# Patient Record
Sex: MALE | Race: WHITE | NOT HISPANIC OR LATINO | URBAN - METROPOLITAN AREA
[De-identification: names, ages, dates, MRNs, and addresses within clinical notes are randomized per-mention and may not be internally consistent; named-entity substitution may affect disease eponyms.]

---

## 2017-02-28 ENCOUNTER — TRANSFERRED RECORDS (OUTPATIENT)
Dept: HEALTH INFORMATION MANAGEMENT | Facility: CLINIC | Age: 37
End: 2017-02-28

## 2017-05-01 ENCOUNTER — CARE COORDINATION (OUTPATIENT)
Dept: TRANSPLANT | Facility: CLINIC | Age: 37
End: 2017-05-01

## 2017-05-05 ENCOUNTER — CARE COORDINATION (OUTPATIENT)
Dept: TRANSPLANT | Facility: CLINIC | Age: 37
End: 2017-05-05

## 2017-05-05 NOTE — PROGRESS NOTES
Faxed Vaccine schedule and updated progress note to Dr. Victor's office at [facility]. Fax # [phone]. Also discussed with RN at [facility] that Dr. Monreal advises that Rustam's endocrinologist and Dr. Victor adjust Rustam's prednisone dosing as they feel is needed, and that Rustam had requested to start drinking alcohol occasionally, which should be at Dr. Victor's discretion. All questions were answered.

## 2017-06-15 ENCOUNTER — TRANSFERRED RECORDS (OUTPATIENT)
Dept: HEALTH INFORMATION MANAGEMENT | Facility: CLINIC | Age: 37
End: 2017-06-15

## 2017-06-16 ENCOUNTER — TRANSFERRED RECORDS (OUTPATIENT)
Dept: HEALTH INFORMATION MANAGEMENT | Facility: CLINIC | Age: 37
End: 2017-06-16

## 2017-07-20 ENCOUNTER — TRANSFERRED RECORDS (OUTPATIENT)
Dept: HEALTH INFORMATION MANAGEMENT | Facility: CLINIC | Age: 37
End: 2017-07-20

## 2017-08-25 ENCOUNTER — TRANSFERRED RECORDS (OUTPATIENT)
Dept: HEALTH INFORMATION MANAGEMENT | Facility: CLINIC | Age: 37
End: 2017-08-25

## 2017-09-27 ENCOUNTER — TRANSFERRED RECORDS (OUTPATIENT)
Dept: HEALTH INFORMATION MANAGEMENT | Facility: CLINIC | Age: 37
End: 2017-09-27

## 2017-10-04 ENCOUNTER — TRANSFERRED RECORDS (OUTPATIENT)
Dept: HEALTH INFORMATION MANAGEMENT | Facility: CLINIC | Age: 37
End: 2017-10-04

## 2017-10-09 ENCOUNTER — CARE COORDINATION (OUTPATIENT)
Dept: TRANSPLANT | Facility: CLINIC | Age: 37
End: 2017-10-09

## 2017-10-09 NOTE — PROGRESS NOTES
"Spoke with Rustam's parents Melony and Rafael who stated that they feel Huseyins ALD is progressing. His text messages at times do not make sense. The wording is incorrect, he leaves words out or uses the wrong word. At times they are completely nonsensical. They state he is losing his upper body strength, and his moods have been more volatile. Discussed with Dr. Monreal, who feels that a brain MRI should be done in [place]. Called Devon, who stated that he is getting an MRI on 10/11 to \"rule out an infection in his bone\". This writer called Dr. Victor's office (Rustam's PCP). His RN stated they would add an order for the MRI and would schedule. Called back Devon who stated they would follow up with Dr. Victor's office tomorrow. All questions were answered.   "

## 2017-10-23 ENCOUNTER — TRANSFERRED RECORDS (OUTPATIENT)
Dept: HEALTH INFORMATION MANAGEMENT | Facility: CLINIC | Age: 37
End: 2017-10-23

## 2017-10-28 ENCOUNTER — TRANSFERRED RECORDS (OUTPATIENT)
Dept: HEALTH INFORMATION MANAGEMENT | Facility: CLINIC | Age: 37
End: 2017-10-28

## 2017-10-30 ENCOUNTER — MEDICAL CORRESPONDENCE (OUTPATIENT)
Dept: HEALTH INFORMATION MANAGEMENT | Facility: CLINIC | Age: 37
End: 2017-10-30

## 2017-10-30 ENCOUNTER — TRANSFERRED RECORDS (OUTPATIENT)
Dept: HEALTH INFORMATION MANAGEMENT | Facility: CLINIC | Age: 37
End: 2017-10-30

## 2017-11-01 ENCOUNTER — TRANSFERRED RECORDS (OUTPATIENT)
Dept: HEALTH INFORMATION MANAGEMENT | Facility: CLINIC | Age: 37
End: 2017-11-01

## 2017-11-02 ENCOUNTER — TRANSFERRED RECORDS (OUTPATIENT)
Dept: HEALTH INFORMATION MANAGEMENT | Facility: CLINIC | Age: 37
End: 2017-11-02

## 2017-11-03 ENCOUNTER — TELEPHONE (OUTPATIENT)
Dept: TRANSPLANT | Facility: CLINIC | Age: 37
End: 2017-11-03

## 2017-11-03 DIAGNOSIS — E71.529 ALD (ADRENOLEUKODYSTROPHY) (H): Primary | ICD-10-CM

## 2017-11-03 NOTE — TELEPHONE ENCOUNTER
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     Diagnosis ALD/graft failure  HCT Type [facility] marrow (first), PBSC (second, same donor)    Prep Regimen Bu Flu TBI   Donor Source Female [facility]      GVHD Prophylaxis [facility]  Primary BMT Provider Holtan       INTERVAL  HISTORY     This is a telephone consultation.  Due to Rustam's complex medical needs, it is not safe for him to travel for his 2 year anniversary testing and consultation with our BMT program.  We decided together to complete his 2 year consultation via telephone.    Over the past year, Rustam has suffered from continued loss of mobility, independence.  He is depressed and anxious most of the time.  It is distressing beyond words for him to not be independent or able to care for his son.  He remains in 24-hour skilled nursing care.  Rustam's mother reports that he has had significant decline in neurocognitive and emotional stability over the past year (e.g., text messages becoming more garbled, emotions becoming more labile).  He has lost ground in global strength.  Fine motor skills remain intact but he has been making some odd rocking or tremor type movements of his body.  Among recent complications, Rustam had Clostridium difficile colitis in June 2017. That was his third episode over his entire posttransplant course.  He had MRSA sepsis as well as a Proteus UTI.  He has ongoing sacral decubitus ulcers that get infected and require care.    In July, Rustam saw his neurologist, Dr. Owens, who noted cognitive and judgement deficits limiting Rustam's ability to live independently.  They discussed neurorehabilitation efforts.    In a progress note from psychiatry on October 23, 2017, Rustam reported no improvement in his depression or anxiety, feeling that it is situational overall. He had no thought of harm to himself or others. His medications were adjusted, and he will be following up  with psychiatry in January 2018.    In a clinic encounter with his primary care physician on August 3, 2017, Rustam had bright red blood per rectum (possible hemorrhoids), and was referred to gastroenterology.  He had evidence of ongoing cognitive and neurobehavioral dysfunction, requiring supervised 24 hour care because of lack of judgment and insight for his own safety and complex care needs. Dr. Victor felt that he would be at risk to himself returning home or even if reduced care in an assisted living environment. They had discussed insomnia, and Dr. Victor felt it would be unsafe to add further sleep medications, suspecting that Rustam was napping during the day. Finally, they discussed his sacral decubitus ulcer.    Rustam had an MRI of the sacrum on October 11, 2017. This was done to assess for osteomyelitis. It was essentially nondiagnostic due to patient motion. However there appeared to be osseous destruction and replacement of the marrow signal within the sacrum that could represent infection.  On the same date, he had an MRI of his brain. This showed stable chronic changes of adrenoleukodystrophy. There was no evidence of restricted diffusion or leading-edge enhancement. The previous posterior involvement was still observed.    On August 30, 2017, Rustam presented with a rash that lasted about one week. It was red, did not itch, was on his head, neck, trunk, back, upper arms, and legs. He thought it may have been related to a new antibiotic given for his coccygeal due to his ulcer with possible osteomyelitis. It was ultimately determined that this new rash was secondary to Bactrim. He started Bactrim on October 21 and that has been stopped.  He is seeing Infectious Diseases today about potential antibiotic strategies.    On November 1, he had a wound care specialist evaluation. He was prescribed dressing changes and gentamicin ointment to the right hip wound.  A swab of the wound from 10/4 had grown of Proteus  mirabilis, as well as light growth of methicillin-resistant Staphylococcus aureus and light growth of streptococcus species.    SF-36 survey (scanned) reveals nearly every domain is markedly impacted by poor health and functional status.      Past medical history:  adrenoleukodystrophy with AMN  gastroesophageal reflux disease  tobacco use disorder  depression/anxiety, history of suicidal ideation  chronic back pain  hyperlipidemia  vitamin D deficiency  primary adrenal insufficiency  urinary retention  generalized weakness  malnutrition  unintentional weight loss  stage IV pressure ulcer  neurogenic bladder with chronic indwelling catheter, superpubic  BPH  ADHD, predominantly inattentive type  insomnia  drug-induced constipation      His current medications include Adderall, vitamin C, Tums, vitamin D, refresh tears, Cymbalta, fentanyl patch 75 mcg/hr, Florinef 0.1 mg 1/2 tab daily, prednisone 5 mg in the morning and 2.5 mg in the afternoon, omeprazole, oxybutynin, Carafate, testosterone, Zanaflex, trazodone, zinc sulfate.    His recent surgeries Cystotomy (8/2017), ablation of bladder neck contracture (4/2017).    PHYSICAL EXAM     Unable to examine; telephone consultation only.    Laboratory studies on August 25, 2017 show a hemoglobin of 15.3, white blood cell count of 10 with a slight neutrophilia, and the platelet count of 214,000. Creatinine was 1.1, and BUN was 21, with otherwise normal electrolytes. Albumin was low at 2.7, alkaline phosphatase was high at 369, with slight elevation in AST at 78 and ALT at 141.        ASSESSMENT BY SYSTEMS     Assessment/Plan: Rustam Wyatt is a 36 yo man with adrenoleukodystrophy, 2 years s/p prep for 2nd reduced intensity allo transplant under protocol MT 2012-11C.      1. ALD:  Engrafted, with normal hematopoiesis. I expect that he remains 100% donor, although we have not specifically repeated chimerisn assessment.  By MRI, his ALD is stable. However, he has had  continued neuropsychiatric decline as well as functional decline over the past two years.  In discussing with Dr. Marcos Gates and after review with Dr. Parmjit Acosta, the consensus is that he is indeed progressing, although we cannot detect change radiographically.  Unfortunately there is no clear biomarker that can be followed in the blood or CSF that correlates well with ALD stablity or progression, either.  His family can clearly see deterioration over the past year that is undoubtedly multifactorial, including contributions from ALD/demyelination, depression/anxiety, ADHD, loss of muscle mass/function, and multiple infections.  Rustam's prognosis is guarded because of this.    We discussed doing everything we can to support an improved quality of life, recognizing that the underlying problem (ALD) is not reversible.  We encouraged regular follow up with his care team, considering supportive medications (e.g., for memory or tremors in conjunction with his neurologist) if it is felt that any supportive medication might be of help.  We discussed seeing if he gained any improvement in functional status by at least a short-term higher exposure to steroids (e.g., hydrocortisone 40 mg every 8 hours, tapered down over time to a new baseline replacement dose of hydrocortisone 10 mg every 8 hours), as our ALD specialists have seen some improvement in function and quality of life with that approach.  Higher doses of steroids do come with potential risks (impaired wound healing, osteoporosis, insomnia, etc), but it may still be worth at least a short term trial of this approach to see if we can impact his quality of life favorably.    2 . ID: He is completing his vaccination series. He has a recent history of probable osteomyelitis of the sacrum related to a stage IV pressure ulcer.  He is off antimicrobial prophylaxis but is seeing ID today to discuss strategies to treat his osteomyelitis.  He remains at significant risk  of sepsis from many potential sources, and we discussed that overall it is his infection risk that is the greatest threat to his life.  There may come a time when Rustam cannot fully recover from multiple infectious/sepsis episodes.  We discussed a palliative care/hospice approach when that day comes.  His family is aware of this type of approach and will be carefully considering when the time is right to palliation as the main goal of care.    3. GVHD:  Gengraf taper completed 9/3/16. Very slight elevation in his LFTs. No obvious sign of chronic GVHD requiring systemic therapy at this time.    4. Endocrine: Primary adrenal insufficiency and hypogonadism related to ALD. Suggest at least short term stress-dosing of steroids, as above.  - Receiving testosterone supplementation.    5. MSK/ Neuro:   - Remains in a nursing home due to disability and complex care needs. Probable osteomyelitis as above. Follows closely with a neurologist closer to home.  We do not feel that repeat MRIs or neuropsychiatric testing would be of additional benefit at the moment.    6. FEN/ Renal: Cr stable.   - Neurogenic bladder, now with indwelling Blas.    7. Psych: h/o ADHD, depression and anxiety.    8. GI:   -Chronic constipation, possible hemorrhoids  -Weight loss, risk of malnutrition    Extremely difficult situation overall.  While there is no sign of Rustam's ALD progressing radiographically, he is showing concerning clinical signs of progression.  He clearly lost ground as can be the case initially after transplant, made even more challenging by the fact that Rustam required a second transplant for engraftment.  We lack better objective tools to help Rustam and his family know his trajectory.  It is difficult to know if where he is now compared to where he would have been if the transplant had not been done.  Rustam and his care team are doing everything possible to improve his situation.    Total time for this telephone consultation:   90 minutes of direct telephone conversation plus an additional 30 minutes of reviews of outside records, laboratory studies, and images    Cecily Monreal MD, pager [phone]

## 2017-11-08 ENCOUNTER — MEDICAL CORRESPONDENCE (OUTPATIENT)
Dept: HEALTH INFORMATION MANAGEMENT | Facility: CLINIC | Age: 37
End: 2017-11-08

## 2017-11-08 ENCOUNTER — CARE COORDINATION (OUTPATIENT)
Dept: TRANSPLANT | Facility: CLINIC | Age: 37
End: 2017-11-08

## 2017-11-08 NOTE — PROGRESS NOTES
Called Rustam to report MRI findings at the request of Dr. Monreal and Rustam's parents/legal guardians Rafael and Melony Duarte. Also provided Rustam with Dr. Monreal's recommendations to increase steroids to help improve his energy level as well as using medications to help with his memory and baseline tremors. Told Rustam that this writer would contact his medical team to provide them with Dr. Monreal's recommendations. Faxed progress notes to Rustam's team, see note from this writer from 11/8/17. All questions were answered.

## 2017-11-08 NOTE — PROGRESS NOTES
At the request of Kamla Duarte, faxed most recent progress note to Rustam's Medical Team in [place]. Faxes were sent to the following individuals.     PCP Dr. Mat Victor  Fax: [phone]    [facility]  Fax: [phone]    Neurologist Dr. Mat Owens  Fax:[phone]    Endocrinologist Dr. Cornelio Reyes  Fax: [phone]    Also sent progress note to Kamla Duarte. Fax confirmation received on all faxes.

## 2019-01-01 ENCOUNTER — TELEPHONE (OUTPATIENT)
Dept: TRANSPLANT | Facility: CLINIC | Age: 39
End: 2019-01-01

## 2019-03-15 NOTE — TELEPHONE ENCOUNTER
Telephone call at the request of Rustam's family.    Rustam has had worsening vision and performance status.  He is totally dependent for cares since January.  He has developed multiple additional pressure ulcers.    Rustam will be entering a hospice program next week at his long-term care facility.    Condolences and support offered to his family.  It has been our pleasure to help care for Rustam over the years.    Cecily Monreal MD, pager [phone]

## 2022-10-08 NOTE — PROGRESS NOTES
Patient called to ask about decreasing his prednisone dosing. He would like to go down to his pre transplant dosing. Discussed with Dr. Monreal, she recommends following up with his local endocrinologist to discuss decreases in his steroids. Rusatm also asked about alcohol use and if it was ok to occasionally drink from a transplant standpoint. Told patient to discuss with his PCP Dr. Victor. Patient stated he was seeing Dr. Victor today and would bring it up with him. All questions were answered. Left VM with Rustam's parents, who are his guardians, to call back to discuss Dr. Monreal's recomendations with them.   
ABHAY Rush